# Patient Record
Sex: FEMALE | Race: WHITE | NOT HISPANIC OR LATINO | Employment: UNEMPLOYED | URBAN - METROPOLITAN AREA
[De-identification: names, ages, dates, MRNs, and addresses within clinical notes are randomized per-mention and may not be internally consistent; named-entity substitution may affect disease eponyms.]

---

## 2017-07-17 ENCOUNTER — HOSPITAL ENCOUNTER (EMERGENCY)
Facility: HOSPITAL | Age: 27
Discharge: HOME | End: 2017-07-17
Attending: EMERGENCY MEDICINE
Payer: MEDICAID

## 2017-07-17 VITALS
RESPIRATION RATE: 20 BRPM | BODY MASS INDEX: 31.28 KG/M2 | WEIGHT: 170 LBS | HEIGHT: 62 IN | DIASTOLIC BLOOD PRESSURE: 77 MMHG | HEART RATE: 80 BPM | SYSTOLIC BLOOD PRESSURE: 114 MMHG | TEMPERATURE: 98 F | OXYGEN SATURATION: 99 %

## 2017-07-17 DIAGNOSIS — R51.9 FRONTAL HEADACHE: Primary | ICD-10-CM

## 2017-07-17 LAB — B-HCG UR QL: NEGATIVE

## 2017-07-17 PROCEDURE — 81025 URINE PREGNANCY TEST: CPT

## 2017-07-17 PROCEDURE — 25000003 PHARM REV CODE 250: Performed by: NURSE PRACTITIONER

## 2017-07-17 PROCEDURE — 99284 EMERGENCY DEPT VISIT MOD MDM: CPT

## 2017-07-17 RX ORDER — IBUPROFEN 600 MG/1
600 TABLET ORAL
Status: COMPLETED | OUTPATIENT
Start: 2017-07-17 | End: 2017-07-17

## 2017-07-17 RX ORDER — NAPROXEN 375 MG/1
375 TABLET ORAL 2 TIMES DAILY WITH MEALS
Qty: 20 TABLET | Refills: 0 | Status: SHIPPED | OUTPATIENT
Start: 2017-07-17

## 2017-07-17 RX ADMIN — IBUPROFEN 600 MG: 600 TABLET ORAL at 01:07

## 2017-07-17 NOTE — ED NOTES
Bed: TL 04  Expected date:   Expected time:   Means of arrival:   Comments:     Rocael Valenzuela NP  07/17/17 1257

## 2017-07-17 NOTE — ED PROVIDER NOTES
SCRIBE #1 NOTE: I, Misbah Delatorre, am scribing for, and in the presence of, Rocael Valenzuela NP. I have scribed the entire note.      History      Chief Complaint   Patient presents with    Migraine     with dizziness. Denies hx migraines.        Review of patient's allergies indicates:  No Known Allergies     HPI   HPI    7/17/2017, 12:55 PM   History obtained from the patient      History of Present Illness: Breanna L Haase is a 26 y.o. female patient, with Hx of migraines, who presents to the Emergency Department for HA which onset gradually 2 months ago. Symptoms are constant and moderate in severity. 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 Pt thinks she had a stroke while in long-term.   No mitigating or exacerbating factors reported. Associated sxs include dizziness. Patient denies any fever, chills, n/v/d, numbness, weakness, CP, SOB, facial asymmetry, speech difficulty, and all other sxs at this time. No further complaints or concerns at this time.         Arrival mode: Personal vehicle     PCP: No primary care provider on file.       Past Medical History:  Past Medical History:   Diagnosis Date    Anxiety     Depression     Hepatitis C        Past Surgical History:  History reviewed. No pertinent surgical history.      Family History:  History reviewed. No pertinent family history.    Social History:  Social History     Social History Main Topics    Smoking status: Current Every Day Smoker     Packs/day: 1.00     Years: 5.00    Smokeless tobacco: Unknown    Alcohol use 25.2 oz/week     42 Cans of beer per week    Drug use:     Sexual activity: Yes     Partners: Male       ROS   Review of Systems   Constitutional: Negative for chills and fever.   HENT: Negative for sore throat.    Respiratory: Negative for shortness of breath.    Cardiovascular: Negative for chest pain.   Gastrointestinal: Negative for diarrhea, nausea and vomiting.   Genitourinary: Negative for dysuria.   Musculoskeletal: Negative for  "back pain.   Skin: Negative for rash.   Neurological: Positive for dizziness and headaches. Negative for seizures, facial asymmetry, weakness, light-headedness and numbness.   Hematological: Does not bruise/bleed easily.       Physical Exam      Initial Vitals [07/17/17 1212]   BP Pulse Resp Temp SpO2   (!) 135/94 90 18 98.1 °F (36.7 °C) 100 %      MAP       107.67          Physical Exam  Nursing Notes and Vital Signs Reviewed.  Constitutional: Patient is in no acute distress. Awake and alert. Well-developed and well-nourished.  Head: Atraumatic. Normocephalic.  Eyes: PERRL. EOM intact. Conjunctivae are not pale. No scleral icterus.  ENT: Mucous membranes are moist. Oropharynx is clear and symmetric.    Neck: Supple. Full ROM. No lymphadenopathy.  Cardiovascular: Regular rate. Regular rhythm. No murmurs, rubs, or gallops. Distal pulses are 2+ and symmetric.  Pulmonary/Chest: No respiratory distress. Clear to auscultation bilaterally. No wheezing, rales, or rhonchi.  Abdominal: Soft and non-distended.  There is no tenderness.  No rebound, guarding, or rigidity.   Musculoskeletal: Moves all extremities. No obvious deformities. No edema.   Skin: Warm and dry.  Neurological:  Alert, awake, and appropriate.  Normal speech.  No acute focal neurological deficits are appreciated.  Psychiatric: Normal affect. Good eye contact. Appropriate in content.    ED Course    Procedures  ED Vital Signs:  Vitals:    07/17/17 1212 07/17/17 1401   BP: (!) 135/94 114/77   Pulse: 90 80   Resp: 18 20   Temp: 98.1 °F (36.7 °C)    TempSrc: Oral    SpO2: 100% 99%   Weight: 77.1 kg (170 lb)    Height: 5' 2" (1.575 m)        Abnormal Lab Results:  Labs Reviewed   PREGNANCY TEST, URINE RAPID        All Lab Results:  Results for orders placed or performed during the hospital encounter of 07/17/17   Rapid Pregnancy, Urine   Result Value Ref Range    Preg Test, Ur Negative          Imaging Results:  Imaging Results          CT Head Without Contrast " (Final result)  Result time 07/17/17 15:05:38    Final result by Misbah Contreras III, MD (07/17/17 15:05:38)                 Impression:     Normal noncontrast CT scan of the brain.     All CT scans at this facility use dose modulation, iterative reconstruction, and/or weight based dosing when appropriate to reduce radiation dose to as low as reasonably achievable.      Electronically signed by: MISBAH CONTRERAS MD  Date:     07/17/17  Time:    15:05              Narrative:    CT of the brain without contrast.    Clinical indication: Headaches.    No prior studies for comparison.    Standard noncontrast CT scan of the brain.       The brain and ventricles are of normal appearance.  No hemorrhage, mass lesion, or hydrocephalus.   No depressed skull fracture.                                      The Emergency Provider reviewed the vital signs and test results, which are outlined above.    ED Discussion     3:07 PM: Reassessed pt at this time.  Pt is awake, alert, and in no distress. Discussed with pt all pertinent ED information and results. Discussed pt dx and plan of tx. Gave pt all f/u and return to the ED instructions. All questions and concerns were addressed at this time. Pt expresses understanding of information and instructions, and is comfortable with plan to discharge. Pt is stable for discharge.    Patient's headache is either consistent with previous headache and/or lacks features concerning for emergent or life threatening condition.  I do not suspect SAH, meningitis, increased IC pressure, infectious, toxic, vascular, CNS, or other EMC.  I have discussed this at length with patient and/or family/caretaker.      ED Medication(s):  Medications   ibuprofen tablet 600 mg (600 mg Oral Given 7/17/17 7842)       New Prescriptions    NAPROXEN (NAPROSYN) 375 MG TABLET    Take 1 tablet (375 mg total) by mouth 2 (two) times daily with meals.             Medical Decision Making    Medical Decision Making:    Clinical Tests:   Lab Tests: Reviewed and Ordered  Radiological Study: Reviewed and Ordered           Scribe Attestation:   Scribe #1: I performed the above scribed service and the documentation accurately describes the services I performed. I attest to the accuracy of the note.    Attending:   Physician Attestation Statement for Scribe #1: I, Rocael Valenzuela NP, personally performed the services described in this documentation, as scribed by Misbah Delatorre, in my presence, and it is both accurate and complete.          Clinical Impression       ICD-10-CM ICD-9-CM   1. Frontal headache R51 784.0       Disposition:   Disposition: Discharged  Condition: Stable           Rocael Valenzuela NP  07/17/17 1511

## 2017-11-29 ENCOUNTER — CLINICAL SUPPORT (OUTPATIENT)
Dept: OCCUPATIONAL MEDICINE | Facility: CLINIC | Age: 27
End: 2017-11-29

## 2017-11-29 DIAGNOSIS — Z56.9 EMPLOYMENT PROBLEM: Primary | ICD-10-CM

## 2017-11-29 LAB
CTP QC/QA: YES
POC 10 PANEL DRUG SCREEN: NEGATIVE

## 2017-11-29 PROCEDURE — 80305 DRUG TEST PRSMV DIR OPT OBS: CPT | Mod: QW,S$GLB,, | Performed by: FAMILY MEDICINE

## 2017-11-29 SDOH — SOCIAL DETERMINANTS OF HEALTH (SDOH): UNSPECIFIED PROBLEMS RELATED TO EMPLOYMENT: Z56.9

## 2017-12-27 ENCOUNTER — CLINICAL SUPPORT (OUTPATIENT)
Dept: OCCUPATIONAL MEDICINE | Facility: CLINIC | Age: 27
End: 2017-12-27

## 2017-12-27 DIAGNOSIS — Z02.83 ENCOUNTER FOR DRUG SCREENING: Primary | ICD-10-CM

## 2017-12-27 LAB
AMPHETAMINE, QUAL, UR: NEGATIVE
COCAINE (METABOLITE), QUAL, UR: NEGATIVE
METHAMPHETAMINE, URINE SCREEN: NEGATIVE
OPIATE SCREEN, URINE: NEGATIVE
THC, UR: NEGATIVE

## 2017-12-27 PROCEDURE — 80305 DRUG TEST PRSMV DIR OPT OBS: CPT | Mod: S$GLB,,, | Performed by: FAMILY MEDICINE
